# Patient Record
Sex: MALE | Race: WHITE | Employment: FULL TIME | URBAN - METROPOLITAN AREA
[De-identification: names, ages, dates, MRNs, and addresses within clinical notes are randomized per-mention and may not be internally consistent; named-entity substitution may affect disease eponyms.]

---

## 2020-10-01 ENCOUNTER — TELEPHONE (OUTPATIENT)
Dept: FAMILY MEDICINE CLINIC | Age: 30
End: 2020-10-01

## 2020-10-01 NOTE — TELEPHONE ENCOUNTER
----- Message from Maria Elena Bogdan sent at 9/30/2020  3:00 PM EDT -----  Regarding: NP Raul Hammond / telephone  Caller's first and last name and relationship to patient (if not the patient): n/a  Best contact number: [phone]  Preferred date and time: as soon as possible  Scheduled appointment date and time: n/a  Reason for appointment: Np est. care  Details to clarify the request: n/a